# Patient Record
Sex: FEMALE | Race: WHITE
[De-identification: names, ages, dates, MRNs, and addresses within clinical notes are randomized per-mention and may not be internally consistent; named-entity substitution may affect disease eponyms.]

---

## 2020-01-12 ENCOUNTER — HOSPITAL ENCOUNTER (INPATIENT)
Dept: HOSPITAL 4 - SED | Age: 80
LOS: 2 days | Discharge: HOME | DRG: 311 | End: 2020-01-14
Attending: FAMILY MEDICINE | Admitting: FAMILY MEDICINE
Payer: COMMERCIAL

## 2020-01-12 VITALS — HEIGHT: 68 IN | BODY MASS INDEX: 30.01 KG/M2 | WEIGHT: 198 LBS

## 2020-01-12 VITALS — SYSTOLIC BLOOD PRESSURE: 152 MMHG

## 2020-01-12 DIAGNOSIS — Z95.1: ICD-10-CM

## 2020-01-12 DIAGNOSIS — R73.9: ICD-10-CM

## 2020-01-12 DIAGNOSIS — Z82.49: ICD-10-CM

## 2020-01-12 DIAGNOSIS — I25.10: ICD-10-CM

## 2020-01-12 DIAGNOSIS — I24.9: Primary | ICD-10-CM

## 2020-01-12 DIAGNOSIS — I10: ICD-10-CM

## 2020-01-12 DIAGNOSIS — E87.6: ICD-10-CM

## 2020-01-12 LAB
ALBUMIN SERPL BCP-MCNC: 3.6 G/DL (ref 3.4–4.8)
ALT SERPL W P-5'-P-CCNC: 25 U/L (ref 12–78)
ANION GAP SERPL CALCULATED.3IONS-SCNC: 8 MMOL/L (ref 5–15)
AST SERPL W P-5'-P-CCNC: 20 U/L (ref 10–37)
BASOPHILS # BLD AUTO: 0.1 K/UL (ref 0–0.2)
BASOPHILS NFR BLD AUTO: 1.3 % (ref 0–2)
BILIRUB SERPL-MCNC: 0.2 MG/DL (ref 0–1)
BUN SERPL-MCNC: 20 MG/DL (ref 8–21)
CALCIUM SERPL-MCNC: 8.6 MG/DL (ref 8.4–11)
CHLORIDE SERPL-SCNC: 101 MMOL/L (ref 98–107)
CREAT SERPL-MCNC: 1.02 MG/DL (ref 0.55–1.3)
EOSINOPHIL # BLD AUTO: 0.2 K/UL (ref 0–0.4)
EOSINOPHIL NFR BLD AUTO: 2.8 % (ref 0–4)
ERYTHROCYTE [DISTWIDTH] IN BLOOD BY AUTOMATED COUNT: 13.1 % (ref 9–15)
GFR SERPL CREATININE-BSD FRML MDRD: (no result) ML/MIN (ref 90–?)
GLUCOSE SERPL-MCNC: 110 MG/DL (ref 70–99)
HCT VFR BLD AUTO: 38.3 % (ref 36–48)
HGB BLD-MCNC: 12.8 G/DL (ref 12–16)
LYMPHOCYTES # BLD AUTO: 2 K/UL (ref 1–5.5)
LYMPHOCYTES NFR BLD AUTO: 33.5 % (ref 20.5–51.5)
MCH RBC QN AUTO: 29 PG (ref 27–31)
MCHC RBC AUTO-ENTMCNC: 34 % (ref 32–36)
MCV RBC AUTO: 87 FL (ref 79–98)
MONOCYTES # BLD MANUAL: 0.7 K/UL (ref 0–1)
MONOCYTES # BLD MANUAL: 12.5 % (ref 1.7–9.3)
NEUTROPHILS # BLD AUTO: 2.9 K/UL (ref 1.8–7.7)
NEUTROPHILS NFR BLD AUTO: 49.9 % (ref 40–70)
PLATELET # BLD AUTO: 190 K/UL (ref 130–430)
POTASSIUM SERPL-SCNC: 3.4 MMOL/L (ref 3.5–5.1)
RBC # BLD AUTO: 4.39 MIL/UL (ref 4.2–6.2)
SODIUM SERPLBLD-SCNC: 138 MMOL/L (ref 136–145)
WBC # BLD AUTO: 5.9 K/UL (ref 4.8–10.8)

## 2020-01-12 PROCEDURE — G0378 HOSPITAL OBSERVATION PER HR: HCPCS

## 2020-01-12 NOTE — NUR
Placed in room 4  . Placed on cardiac monitor, blood pressure machine and pulse 
oximeter. To gown for exam. Side rails up.

## 2020-01-12 NOTE — NUR
Medication reconciliation completed with information provided by [facility]. Any 
prior medication reconciliation on file was reviewed and corrected.

## 2020-01-12 NOTE — NUR
Patient BIB [facility] from [facility] c/o chest pain that lasted about 15 minutes. Pt 
states she was at home and suddenly felt dizzy and had chest pain, 
non-radiating, non provoked. 10minutes prior to arriving, chest pain suddenly 
disappeared. No medications were given en route. Pt denies N/V, fever. Pt came 
in with 20 gauge to left AC placed by [facility]. NO other injuries/complaints 
per patient or noted.

## 2020-01-13 VITALS — SYSTOLIC BLOOD PRESSURE: 163 MMHG

## 2020-01-13 VITALS — SYSTOLIC BLOOD PRESSURE: 137 MMHG

## 2020-01-13 VITALS — SYSTOLIC BLOOD PRESSURE: 148 MMHG

## 2020-01-13 RX ADMIN — Medication SCH ML: at 20:35

## 2020-01-13 RX ADMIN — OXCARBAZEPINE SCH MG: 150 TABLET, FILM COATED ORAL at 20:35

## 2020-01-13 NOTE — NUR
RN rounds

patient resting in bed, eyes closed, breathing is even and unlabored, no signs of distress, 
continuing to monitor, call light is within reach of patient, bed in lowest position, two 
side rails up, fall precautions in place.

## 2020-01-13 NOTE — NUR
Dr. Peters, Y rounds

assessed patient at bedside, aware of elevated blood pressure, will follow up with any new 
orders.

## 2020-01-13 NOTE — NUR
Assisted patient to restroom and back to [facility]. No acute distress, will 
continue to monitor.

## 2020-01-13 NOTE — NUR
Closing note

patient resting in bed, awake, denies pain, all needs met, will endorse report to NOC shift 
nurse, bed in lowest position, two side rails up, call light within patient reach, fall and 
aspiration precautions in place.

## 2020-01-13 NOTE — NUR
RN rounds

patient resting in bed, awake, denies pain, vital signs taken, provided patient with ice 
water, no other needs at this time, bed in lowest position, two side rails up, call light 
within reach, fall and aspiration precautions in place.

## 2020-01-13 NOTE — NUR
ADMISSION NOTE

Received patient from ER via [facility], received report from MEY LAMBERT. Patient admitted with 
diagnosis of CHEST PAIN. Patient oriented to hospital routine, call light, toileting and 
safety-patient verbalized understanding.

## 2020-01-13 NOTE — NUR
Patient will be admitted to care of Dr. Peters.  Admitted to Telemetry unit.  
Will go to room 108A.  Belongings list completed.  Complete and up to date 
summary report printed. SBAR report given to FAUSTO Willoughby at bedside with 
opportunity for questions.





Transfer to Tele via ACLS protocol. Registered nurse and Monitor tech present. 
IV present no signs or symptoms of infiltration.

## 2020-01-13 NOTE — NUR
Patient will be admitted to care of Dr. SIMONA Peters.  Admitted to Telemetry 
unit. Belongings list completed.  Complete and up to date summary report 
printed. SBAR report to be given at bedside with opportunity for questions.

## 2020-01-13 NOTE — NUR
RN rounds

patient resting in bed, awake, patient denies pain, denies shortness of breath, reinforced 
the tape on her IV, patient has no other needs at this time, bed in lowest position, two 
side rails up, call light within reach, fall and aspiration precautions in place, continuing 
to monitor.

## 2020-01-14 VITALS — SYSTOLIC BLOOD PRESSURE: 144 MMHG

## 2020-01-14 VITALS — SYSTOLIC BLOOD PRESSURE: 135 MMHG

## 2020-01-14 VITALS — SYSTOLIC BLOOD PRESSURE: 149 MMHG

## 2020-01-14 VITALS — SYSTOLIC BLOOD PRESSURE: 148 MMHG

## 2020-01-14 LAB
ANION GAP SERPL CALCULATED.3IONS-SCNC: 5 MMOL/L (ref 5–15)
BASOPHILS # BLD AUTO: 0.1 K/UL (ref 0–0.2)
BASOPHILS NFR BLD AUTO: 1 % (ref 0–2)
BUN SERPL-MCNC: 13 MG/DL (ref 8–21)
CALCIUM SERPL-MCNC: 8.8 MG/DL (ref 8.4–11)
CHLORIDE SERPL-SCNC: 103 MMOL/L (ref 98–107)
CREAT SERPL-MCNC: 0.7 MG/DL (ref 0.55–1.3)
EOSINOPHIL # BLD AUTO: 0.3 K/UL (ref 0–0.4)
EOSINOPHIL NFR BLD AUTO: 5 % (ref 0–4)
ERYTHROCYTE [DISTWIDTH] IN BLOOD BY AUTOMATED COUNT: 13.3 % (ref 9–15)
GFR SERPL CREATININE-BSD FRML MDRD: (no result) ML/MIN (ref 90–?)
GLUCOSE SERPL-MCNC: 94 MG/DL (ref 70–99)
HCT VFR BLD AUTO: 40.7 % (ref 36–48)
HGB BLD-MCNC: 13.7 G/DL (ref 12–16)
LYMPHOCYTES # BLD AUTO: 2 K/UL (ref 1–5.5)
LYMPHOCYTES NFR BLD AUTO: 37.8 % (ref 20.5–51.5)
MCH RBC QN AUTO: 29 PG (ref 27–31)
MCHC RBC AUTO-ENTMCNC: 34 % (ref 32–36)
MCV RBC AUTO: 87 FL (ref 79–98)
MONOCYTES # BLD MANUAL: 0.7 K/UL (ref 0–1)
MONOCYTES # BLD MANUAL: 13.1 % (ref 1.7–9.3)
NEUTROPHILS # BLD AUTO: 2.2 K/UL (ref 1.8–7.7)
NEUTROPHILS NFR BLD AUTO: 43.1 % (ref 40–70)
PLATELET # BLD AUTO: 192 K/UL (ref 130–430)
POTASSIUM SERPL-SCNC: 3.5 MMOL/L (ref 3.5–5.1)
RBC # BLD AUTO: 4.68 MIL/UL (ref 4.2–6.2)
SODIUM SERPLBLD-SCNC: 138 MMOL/L (ref 136–145)
WBC # BLD AUTO: 5.2 K/UL (ref 4.8–10.8)

## 2020-01-14 RX ADMIN — Medication SCH ML: at 05:51

## 2020-01-14 RX ADMIN — OXCARBAZEPINE SCH MG: 150 TABLET, FILM COATED ORAL at 08:24

## 2020-01-14 NOTE — NUR
Hourly Rounding 

 patient Resting call bell given to patient chest movement symmetrical self position patient 
is ambulatory .

## 2020-01-14 NOTE — NUR
Note

Pt sitting up in bed eating her breakfast. No SOB/resp distress or chest pain/discomfort was 
noted at this time. Tele unit attached and intact. IV in left AC intact and patent. Pt 
ambulates to restroom with steady gait and independently at this time. No needs noted. Call 
light within reach.

## 2020-01-14 NOTE — NUR
DC PLANNING

RECEIVED CALL FROM SERAFIN MARTIN AT PHONE NUMBER [phone] ADVISED CASE WAS BEING 
APPROVED AS OBS LEVEL OF CARE ONLY. REQUESTING WHAT IS HOLDING DC ADVISED CARDIOLOGY 
CONSULT. CM WILL NOTIFY PRIMARY ATTENDING OF INSURANCE DETERMINATION.

## 2020-01-14 NOTE — NUR
FALL MEASURES implemented 

 call bell given to patient procedures explained SAFETY MEASURES effective .

## 2020-01-14 NOTE — NUR
Note

Tele unit dc'd and returned to monitor tech. IV in left AC dc'd. Site benign, no 
swelling/redness/bleeding/drainage noted at this time.

## 2020-01-14 NOTE — NUR
SS NOTES:



MSW was referred by CM to see patient for DCP. Demographic information confirmed (NOK: Satya Olivera, son @ [phone], Person to Notify: Rell Olivera, son @ [phone]).



MSW met with patient at bedside. Pt was alert and oriented, and cooperative. Pt appeared to 
be disheveled, normal mood and speech with good eye contact. Pt's affect was appropriate and 
thought process was intact.



Pt is an 78 y/o   female who came in for chest pain. Pt is a resident at 
[facility] for 2 years now. Pt is independent with her ADL's, including driving to/from MD 
appointments. Pt utilizes a cane to go around the community only, otherwise she is 
independent with ambulating short distances. Pt's residence provides her with meals 3x/day, 
laundry and housekeeping. Pt's source of income is her longterm, social security and her 
savings. Pt states she finds support from her sister that lives in [place], but has three 
other sons for additional support. Pt has an advanced directive and son Satya Olivera is her 
POA. Pt states she has a long history of depression and has "a little bit of depression" 
currently due to "arguing with staff to get my bladder pills last night". Pt states she 
asked the RN to get her bladder pills last night but it took a long time to obtain it. Pt 
denies psych admissions in the past. Pt states her PCP prescribes her with Paxil for her 
depression. Pt states she demetrio with "activities", like "Poker" when she's depressed. Pt 
denies history or current substance use/abuse/smoking. No further SS needs identified at 
this time, but will remain available for support.

## 2020-01-14 NOTE — NUR
Note

Pt dressed in orange gown and bath robe. Pt's clothes all soiled on admission from ED. Pt 
packed all belongings and discharge instructions given. Questions/concerns were answered at 
this time. Pt stable, denies any SOB/resp distress or chest pain/discomfort

Pt checked side table and drawers for belongings. Call light within reach.

## 2020-01-14 NOTE — NUR
Patient assist out of bed 

 ambulates BRP no SOB chest movement symmetrical safety measures implemented & effective .

## 2020-01-14 NOTE — NUR
Note

Pt off the floor via wheelchair with all her belongings and discharge paperwork, daughter in 
law by her side to private car. Pt stable, no chest pain/discomfort noted all shift. 

-------------------------------------------------------------------------------

Addendum: 01/14/20 at 1501 by Maggi Lane RN

-------------------------------------------------------------------------------

Pt was checked on q1' and PRN all shift for needs and care.

## 2020-01-14 NOTE — NUR
Note

Pt ambulates in hallway with steady gait and no needs noted. Pt informed Dr FARRAH Peters has 
discharged pt from cardiac point of view. Pt's son Norm called and he will pick pt up at 
1300 -  he is at work. Pt notified. Call light within reach. No needs noted.

## 2020-03-17 ENCOUNTER — HOSPITAL ENCOUNTER (EMERGENCY)
Dept: HOSPITAL 4 - SED | Age: 80
Discharge: LEFT BEFORE DISCHARGE | End: 2020-03-17
Payer: COMMERCIAL

## 2020-03-17 ENCOUNTER — HOSPITAL ENCOUNTER (INPATIENT)
Dept: HOSPITAL 4 - SED | Age: 80
LOS: 4 days | Discharge: HOME | DRG: 640 | End: 2020-03-21
Attending: INTERNAL MEDICINE | Admitting: INTERNAL MEDICINE
Payer: COMMERCIAL

## 2020-03-17 VITALS — SYSTOLIC BLOOD PRESSURE: 150 MMHG

## 2020-03-17 VITALS — HEIGHT: 67 IN | WEIGHT: 200 LBS | BODY MASS INDEX: 31.39 KG/M2

## 2020-03-17 VITALS — SYSTOLIC BLOOD PRESSURE: 151 MMHG

## 2020-03-17 DIAGNOSIS — Z79.899: ICD-10-CM

## 2020-03-17 DIAGNOSIS — J20.9: ICD-10-CM

## 2020-03-17 DIAGNOSIS — I95.1: ICD-10-CM

## 2020-03-17 DIAGNOSIS — R53.1: Primary | ICD-10-CM

## 2020-03-17 DIAGNOSIS — E87.6: ICD-10-CM

## 2020-03-17 DIAGNOSIS — M19.90: ICD-10-CM

## 2020-03-17 DIAGNOSIS — Z95.1: ICD-10-CM

## 2020-03-17 DIAGNOSIS — N39.0: ICD-10-CM

## 2020-03-17 DIAGNOSIS — I10: ICD-10-CM

## 2020-03-17 DIAGNOSIS — Z90.710: ICD-10-CM

## 2020-03-17 DIAGNOSIS — I25.10: ICD-10-CM

## 2020-03-17 DIAGNOSIS — E66.9: ICD-10-CM

## 2020-03-17 DIAGNOSIS — E11.9: ICD-10-CM

## 2020-03-17 DIAGNOSIS — Z53.21: ICD-10-CM

## 2020-03-17 DIAGNOSIS — R13.10: ICD-10-CM

## 2020-03-17 DIAGNOSIS — E87.1: Primary | ICD-10-CM

## 2020-03-17 DIAGNOSIS — N17.0: ICD-10-CM

## 2020-03-17 DIAGNOSIS — D64.9: ICD-10-CM

## 2020-03-17 DIAGNOSIS — E86.0: ICD-10-CM

## 2020-03-17 DIAGNOSIS — Z86.73: ICD-10-CM

## 2020-03-17 DIAGNOSIS — R56.9: ICD-10-CM

## 2020-03-17 DIAGNOSIS — I65.29: ICD-10-CM

## 2020-03-17 DIAGNOSIS — E44.0: ICD-10-CM

## 2020-03-17 DIAGNOSIS — F32.9: ICD-10-CM

## 2020-03-17 LAB
ALBUMIN SERPL BCP-MCNC: 2.7 G/DL (ref 3.4–4.8)
ALT SERPL W P-5'-P-CCNC: 37 U/L (ref 12–78)
ANION GAP SERPL CALCULATED.3IONS-SCNC: 9 MMOL/L (ref 5–15)
AST SERPL W P-5'-P-CCNC: 48 U/L (ref 10–37)
BASOPHILS # BLD AUTO: 0 K/UL (ref 0–0.2)
BASOPHILS NFR BLD AUTO: 0.4 % (ref 0–2)
BILIRUB SERPL-MCNC: 0.9 MG/DL (ref 0–1)
BUN SERPL-MCNC: 38 MG/DL (ref 8–21)
CALCIUM SERPL-MCNC: 9.1 MG/DL (ref 8.4–11)
CHLORIDE SERPL-SCNC: 89 MMOL/L (ref 98–107)
CREAT SERPL-MCNC: 1.42 MG/DL (ref 0.55–1.3)
EOSINOPHIL # BLD AUTO: 0.2 K/UL (ref 0–0.4)
EOSINOPHIL NFR BLD AUTO: 1.7 % (ref 0–4)
ERYTHROCYTE [DISTWIDTH] IN BLOOD BY AUTOMATED COUNT: 13.6 % (ref 9–15)
GFR SERPL CREATININE-BSD FRML MDRD: (no result) ML/MIN (ref 90–?)
GLUCOSE SERPL-MCNC: 100 MG/DL (ref 70–99)
HCT VFR BLD AUTO: 35 % (ref 36–48)
HGB BLD-MCNC: 11.9 G/DL (ref 12–16)
LYMPHOCYTES # BLD AUTO: 0.8 K/UL (ref 1–5.5)
LYMPHOCYTES NFR BLD AUTO: 8.7 % (ref 20.5–51.5)
MCH RBC QN AUTO: 29 PG (ref 27–31)
MCHC RBC AUTO-ENTMCNC: 34 % (ref 32–36)
MCV RBC AUTO: 87 FL (ref 79–98)
MONOCYTES # BLD MANUAL: 1.2 K/UL (ref 0–1)
MONOCYTES # BLD MANUAL: 12.8 % (ref 1.7–9.3)
NEUTROPHILS # BLD AUTO: 7.4 K/UL (ref 1.8–7.7)
NEUTROPHILS NFR BLD AUTO: 76.4 % (ref 40–70)
PLATELET # BLD AUTO: 173 K/UL (ref 130–430)
POTASSIUM SERPL-SCNC: 3.5 MMOL/L (ref 3.5–5.1)
RBC # BLD AUTO: 4.05 MIL/UL (ref 4.2–6.2)
SODIUM SERPLBLD-SCNC: 125 MMOL/L (ref 136–145)
WBC # BLD AUTO: 9.6 K/UL (ref 4.8–10.8)

## 2020-03-17 PROCEDURE — G0378 HOSPITAL OBSERVATION PER HR: HCPCS

## 2020-03-17 NOTE — NUR
pt was initially bib via bls d/t gen weak and a cough for a year. Pt called 911 
herself, however, did not want to stay in this hospital. Pt's family was 
notified and convinced her to stay. Family expressed concerns after the pt had 
a syncopal episode. PT is AAOx3. EKG done and given to MD. Lung sounds a clear. 
Cardiac monitor placed. Will continue to monitor.

## 2020-03-17 NOTE — NUR
Initial RN notes



Received pt from ED.  Pt AAOx3, VSS, afebrile.  No s/s distress noted.  IVF infusing R. AC 
20G clear and patent.  Small bruise noted on R. abd, skin intact.  Oriented pt to bed/call 
light use, pt verbalized understanding.  Bed low, locked, siderails up x3, alarm on.  Safety 
maintained.  To monitor.

## 2020-03-17 NOTE — NUR
Transfer to TELE via ACLS protocol. Licensed nurse present. IV present no signs 
or symptoms of infiltration.

## 2020-03-17 NOTE — NUR
-------------------------------------------------------------------------------

           *** Note undone in [facility], [place] - 03/17/20 at 1923 by [email or web address] ***            

-------------------------------------------------------------------------------

Report received from Nat LAMBERT

## 2020-03-17 NOTE — NUR
Patient will be admitted to care of .  Admitted to TELE unit.  Will go 
to room 133B.  Belongings list completed.  Complete and up to date summary 
report printed. SBAR report to be given at bedside with opportunity for 
questions.

## 2020-03-17 NOTE — NUR
# 20 gauge angiocath placed to rac.  Use of asceptic technique.  Opsite placed 
over site.  Blood return noted.  Blood for lab drawn from site.  Flushed with 
10 cc of normal saline.  No evidence of infiltration noted.  Patient tolerated 
well.

## 2020-03-17 NOTE — NUR
ADMISSION NOTE

Received patient from ER via [facility], received report from FAUSTO FREY. Patient admitted with 
diagnosis of SYNCOPE, HYPONATREMIA. Patient oriented to hospital routine, call light, 
toileting and safety-patient verbalized understanding.

## 2020-03-17 NOTE — NUR
report given to Aleksandr LAMBERT. Pt is in stable condition. Currently awaiting 
transfer to [facility] or admission.

## 2020-03-18 VITALS — SYSTOLIC BLOOD PRESSURE: 181 MMHG

## 2020-03-18 VITALS — SYSTOLIC BLOOD PRESSURE: 165 MMHG

## 2020-03-18 VITALS — SYSTOLIC BLOOD PRESSURE: 140 MMHG

## 2020-03-18 VITALS — SYSTOLIC BLOOD PRESSURE: 154 MMHG

## 2020-03-18 VITALS — SYSTOLIC BLOOD PRESSURE: 168 MMHG

## 2020-03-18 VITALS — SYSTOLIC BLOOD PRESSURE: 151 MMHG

## 2020-03-18 LAB
ALBUMIN SERPL BCP-MCNC: 2.4 G/DL (ref 3.4–4.8)
ALT SERPL W P-5'-P-CCNC: 82 U/L (ref 12–78)
ANION GAP SERPL CALCULATED.3IONS-SCNC: 11 MMOL/L (ref 5–15)
APPEARANCE UR: (no result)
AST SERPL W P-5'-P-CCNC: 82 U/L (ref 10–37)
BACTERIA URNS QL MICRO: (no result) /HPF
BASOPHILS # BLD AUTO: 0 K/UL (ref 0–0.2)
BASOPHILS NFR BLD AUTO: 0.4 % (ref 0–2)
BILIRUB SERPL-MCNC: 0.8 MG/DL (ref 0–1)
BILIRUB UR QL STRIP: NEGATIVE
BUN SERPL-MCNC: 31 MG/DL (ref 8–21)
CALCIUM SERPL-MCNC: 8 MG/DL (ref 8.4–11)
CHLORIDE SERPL-SCNC: 95 MMOL/L (ref 98–107)
CHOLEST SERPL-MCNC: 158 MG/DL (ref ?–200)
COLOR UR: YELLOW
CREAT SERPL-MCNC: 1.21 MG/DL (ref 0.55–1.3)
EOSINOPHIL # BLD AUTO: 0.2 K/UL (ref 0–0.4)
EOSINOPHIL NFR BLD AUTO: 1.7 % (ref 0–4)
ERYTHROCYTE [DISTWIDTH] IN BLOOD BY AUTOMATED COUNT: 13.3 % (ref 9–15)
GFR SERPL CREATININE-BSD FRML MDRD: (no result) ML/MIN (ref 90–?)
GLUCOSE SERPL-MCNC: 123 MG/DL (ref 70–99)
GLUCOSE UR STRIP-MCNC: NEGATIVE MG/DL
HCT VFR BLD AUTO: 32.5 % (ref 36–48)
HDLC SERPL-MCNC: 19 MG/DL (ref 55–?)
HGB BLD-MCNC: 11.2 G/DL (ref 12–16)
HGB UR QL STRIP: (no result)
KETONES UR STRIP-MCNC: NEGATIVE MG/DL
LDL CHOLESTEROL: 78 MG/DL (ref ?–100)
LEUKOCYTE ESTERASE UR QL STRIP: (no result)
LYMPHOCYTES # BLD AUTO: 0.7 K/UL (ref 1–5.5)
LYMPHOCYTES NFR BLD AUTO: 6.5 % (ref 20.5–51.5)
MCH RBC QN AUTO: 30 PG (ref 27–31)
MCHC RBC AUTO-ENTMCNC: 34 % (ref 32–36)
MCV RBC AUTO: 86 FL (ref 79–98)
MONOCYTES # BLD MANUAL: 1.3 K/UL (ref 0–1)
MONOCYTES # BLD MANUAL: 12.3 % (ref 1.7–9.3)
NEUTROPHILS # BLD AUTO: 8.1 K/UL (ref 1.8–7.7)
NEUTROPHILS NFR BLD AUTO: 79.1 % (ref 40–70)
NITRITE UR QL STRIP: POSITIVE
PH UR STRIP: 6 [PH] (ref 5–8)
PLATELET # BLD AUTO: 166 K/UL (ref 130–430)
POTASSIUM SERPL-SCNC: 2.4 MMOL/L (ref 3.5–5.1)
PROT UR QL STRIP: (no result)
RBC # BLD AUTO: 3.78 MIL/UL (ref 4.2–6.2)
SODIUM SERPLBLD-SCNC: 131 MMOL/L (ref 136–145)
SP GR UR STRIP: 1.01 (ref 1–1.03)
T4 FREE SERPL-MCNC: 1 NG/DL (ref 0.8–1.5)
TRIGL SERPL-MCNC: 141 MG/DL (ref 30–150)
TSH SERPL DL<=0.05 MIU/L-ACNC: 0.8 UIU/ML (ref 0.36–3.74)
UROBILINOGEN UR STRIP-MCNC: 0.2 MG/DL (ref 0.2–1)
WBC # BLD AUTO: 10.2 K/UL (ref 4.8–10.8)
WBC #/AREA URNS HPF: (no result) /HPF (ref 0–3)

## 2020-03-18 RX ADMIN — POTASSIUM CHLORIDE SCH MEQ: 20 TABLET, EXTENDED RELEASE ORAL at 21:30

## 2020-03-18 RX ADMIN — CLOPIDOGREL BISULFATE SCH MG: 75 TABLET, FILM COATED ORAL at 08:50

## 2020-03-18 RX ADMIN — OXCARBAZEPINE SCH MG: 150 TABLET, FILM COATED ORAL at 08:50

## 2020-03-18 RX ADMIN — HYDROMORPHONE HYDROCHLORIDE PRN MG: 8 TABLET ORAL at 05:22

## 2020-03-18 RX ADMIN — OXCARBAZEPINE SCH MG: 150 TABLET, FILM COATED ORAL at 21:31

## 2020-03-18 RX ADMIN — EZETIMIBE SCH MG: 10 TABLET ORAL at 08:50

## 2020-03-18 RX ADMIN — OXYBUTYNIN CHLORIDE SCH MG: 5 TABLET ORAL at 21:30

## 2020-03-18 RX ADMIN — METOPROLOL SUCCINATE SCH MG: 50 TABLET, EXTENDED RELEASE ORAL at 21:31

## 2020-03-18 RX ADMIN — OXYBUTYNIN CHLORIDE SCH MG: 5 TABLET ORAL at 08:56

## 2020-03-18 NOTE — NUR
Note

Pt ate her dinner and now resting in bed. Tele unit attached and intact at this time. IV in 
right AC intact and patent infusing IVF's well. No SOB/resp distress or pain/discomfort 
noted at this time. Pt was checked on q1' and PRN all shift for needs and care. No needs 
noted at this time. Call light within reach.

## 2020-03-18 NOTE — NUR
change of shift.pt.presents quiescent affect;calm,resting pt.presents iv access 
intact;patent iv fluids;ns:3% infusing.

general status stable.respiratory status stable;unlabored@room air.call light/telephone w/in 
reach of the pt.

## 2020-03-18 NOTE — NUR
Rounds/snacks



Pt awake, alert, requesting food.  [place] and cranberry juice provided.  Pt thankful.  HOB 
elevated.  Call light within reach.  To monitor.

## 2020-03-18 NOTE — NUR
Rounds



Pt asleep, easily arousable.  Pt moved to Room 121-C.  All belongings with pt.   To monitor.

## 2020-03-18 NOTE — NUR
Note

Pt was taken down to Radiology for CT of head/brain via gurney at 1015am and now back to 
room/bed. IVF's reapplied. No needs noted at this time. Call light within reach.

## 2020-03-18 NOTE — NUR
pt.assessed.pt.presents quiescent affect;calm,somnolent.pt.assessed for 
cleanliness.pt.repositioned.iv fluid:ns:3% administration completed.

general status stable.respiratory status stable;unlabored.call light/telephone placed w/in 
reach of the pt.

## 2020-03-18 NOTE — NUR
Note

Pt working with Physical Therapy at bedside at this time. 

Dr Arellano was at bedside assessing pt at 1115am - answering questions/concerns at this time. 
Call light within reach. No needs noted at this time.

## 2020-03-18 NOTE — NUR
pt.assessed.v/s assessed values w/in normal limits.no c/o pain,nausea.pt.assessed for 
cleanliness.pt.presents incontinence ;urine.pt.cleaned.pt.repositioned.iv access 
intact;patent;ns:3% infusing.general status stable.respiratory status stable;

unlabored

@room air.i have apprised the pt.that snacks/beverages are available w/in the shift.no 
requests posited@this hour.call light/

telephone phone placed w/in reach of the pt.

## 2020-03-18 NOTE — NUR
Closing notes/Pain med



Pt awake, c/o L. sided sharp headache.  Medicated with Tylenol 500mg PO.  VSS.  IVF infusing 
at ordered rate R. AC 20G clear and patent.  Call light within easy reach.  [employer] SCDs in 
place.  Safety maintained.  Bed low, locked, siderails up x3.  Awaiting consults.  To 
endorse to AM nurse.

## 2020-03-18 NOTE — NUR
Note

Pt sitting up in bed eating her breakfast. No SOB/resp distress or pain/discomfort noted at 
this time. Tele unit attached and intact. IV in right AC intact and patent infusing IVF's 
well at this time. No needs noted at this time. Call light within reach. Pt has SCD's on 
bilaterally at this time.

## 2020-03-18 NOTE — NUR
Note

Pt having US of carotids at bedside at this time. Pt denies any needs. Call light within 
reach.

## 2020-03-18 NOTE — NUR
CONSULT:

CONSULT CALLED FOR DR. GRIER

I SPOKE WITH JASPER EXCHANGE

REASON FOR CONSULT: SYNCOPE

REQUESTING CONSULT: DR. GUIDO

CONSULTANT PHONE NUMBER: [phone]

## 2020-03-18 NOTE — NUR
CONSULT:

CONSULT CALLED FOR SINCERE CAT SPOKE WITH JASPER EXCHANGE

REASON FOR CONSULT: SYNCOPE

REQUESTING CONSULT: DR. GUIDO

CONSULTANT PHONE NUMBER: [phone]

## 2020-03-18 NOTE — NUR
Nutrition Update



Keagan Scale 17 noted.

Pt admitted for syncope/hyponatremia.

Diet: regular, mechanical soft

BMI: 31.3 kg/m2



RD to follow per nutrition care standards.

## 2020-03-18 NOTE — NUR
2100pmedications administered.pt.capable to ingest medication absent difficulty.i noted 
pt.ordered the medication trileptal;300mg po.

i have attended to the sx precautions;padded the side rails.i have apprised the pt.of the 
intervention.i have applied the scd's 

compression stockings.i have apprised the of the indication for the intervention.no c/o 
pain,nausea.

## 2020-03-19 VITALS — SYSTOLIC BLOOD PRESSURE: 140 MMHG

## 2020-03-19 VITALS — SYSTOLIC BLOOD PRESSURE: 129 MMHG

## 2020-03-19 VITALS — SYSTOLIC BLOOD PRESSURE: 156 MMHG

## 2020-03-19 VITALS — SYSTOLIC BLOOD PRESSURE: 152 MMHG

## 2020-03-19 VITALS — SYSTOLIC BLOOD PRESSURE: 128 MMHG

## 2020-03-19 VITALS — SYSTOLIC BLOOD PRESSURE: 162 MMHG

## 2020-03-19 LAB
ALBUMIN SERPL BCP-MCNC: 2.5 G/DL (ref 3.4–4.8)
ALT SERPL W P-5'-P-CCNC: 110 U/L (ref 12–78)
ANION GAP SERPL CALCULATED.3IONS-SCNC: 8 MMOL/L (ref 5–15)
AST SERPL W P-5'-P-CCNC: 80 U/L (ref 10–37)
BASOPHILS # BLD AUTO: 0 K/UL (ref 0–0.2)
BASOPHILS NFR BLD AUTO: 0.2 % (ref 0–2)
BILIRUB SERPL-MCNC: 0.8 MG/DL (ref 0–1)
BUN SERPL-MCNC: 24 MG/DL (ref 8–21)
CALCIUM SERPL-MCNC: 8.6 MG/DL (ref 8.4–11)
CHLORIDE SERPL-SCNC: 100 MMOL/L (ref 98–107)
CREAT SERPL-MCNC: 1.23 MG/DL (ref 0.55–1.3)
EOSINOPHIL # BLD AUTO: 0.1 K/UL (ref 0–0.4)
EOSINOPHIL NFR BLD AUTO: 0.7 % (ref 0–4)
ERYTHROCYTE [DISTWIDTH] IN BLOOD BY AUTOMATED COUNT: 13.8 % (ref 9–15)
GFR SERPL CREATININE-BSD FRML MDRD: (no result) ML/MIN (ref 90–?)
GLUCOSE SERPL-MCNC: 128 MG/DL (ref 70–99)
HCT VFR BLD AUTO: 34.3 % (ref 36–48)
HGB BLD-MCNC: 11.6 G/DL (ref 12–16)
LYMPHOCYTES # BLD AUTO: 0.7 K/UL (ref 1–5.5)
LYMPHOCYTES NFR BLD AUTO: 6.1 % (ref 20.5–51.5)
MCH RBC QN AUTO: 30 PG (ref 27–31)
MCHC RBC AUTO-ENTMCNC: 34 % (ref 32–36)
MCV RBC AUTO: 87 FL (ref 79–98)
MONOCYTES # BLD MANUAL: 1.4 K/UL (ref 0–1)
MONOCYTES # BLD MANUAL: 13 % (ref 1.7–9.3)
NEUTROPHILS # BLD AUTO: 8.6 K/UL (ref 1.8–7.7)
NEUTROPHILS NFR BLD AUTO: 80 % (ref 40–70)
PLATELET # BLD AUTO: 189 K/UL (ref 130–430)
POTASSIUM SERPL-SCNC: 3.4 MMOL/L (ref 3.5–5.1)
RBC # BLD AUTO: 3.92 MIL/UL (ref 4.2–6.2)
SODIUM SERPLBLD-SCNC: 135 MMOL/L (ref 136–145)
WBC # BLD AUTO: 10.7 K/UL (ref 4.8–10.8)

## 2020-03-19 RX ADMIN — METOPROLOL SUCCINATE SCH MG: 50 TABLET, EXTENDED RELEASE ORAL at 20:51

## 2020-03-19 RX ADMIN — DEXTROSE MONOHYDRATE SCH MLS/HR: 50 INJECTION, SOLUTION INTRAVENOUS at 13:25

## 2020-03-19 RX ADMIN — IPRATROPIUM BROMIDE AND ALBUTEROL SULFATE SCH ML: .5; 3 SOLUTION RESPIRATORY (INHALATION) at 07:06

## 2020-03-19 RX ADMIN — EZETIMIBE SCH MG: 10 TABLET ORAL at 08:50

## 2020-03-19 RX ADMIN — OXYBUTYNIN CHLORIDE SCH MG: 5 TABLET ORAL at 20:50

## 2020-03-19 RX ADMIN — CLOPIDOGREL BISULFATE SCH MG: 75 TABLET, FILM COATED ORAL at 08:50

## 2020-03-19 RX ADMIN — IPRATROPIUM BROMIDE AND ALBUTEROL SULFATE SCH ML: .5; 3 SOLUTION RESPIRATORY (INHALATION) at 11:13

## 2020-03-19 RX ADMIN — OXCARBAZEPINE SCH MG: 150 TABLET, FILM COATED ORAL at 08:50

## 2020-03-19 RX ADMIN — IPRATROPIUM BROMIDE AND ALBUTEROL SULFATE SCH ML: .5; 3 SOLUTION RESPIRATORY (INHALATION) at 23:00

## 2020-03-19 RX ADMIN — IPRATROPIUM BROMIDE AND ALBUTEROL SULFATE SCH ML: .5; 3 SOLUTION RESPIRATORY (INHALATION) at 19:28

## 2020-03-19 RX ADMIN — IPRATROPIUM BROMIDE AND ALBUTEROL SULFATE SCH ML: .5; 3 SOLUTION RESPIRATORY (INHALATION) at 15:14

## 2020-03-19 RX ADMIN — POTASSIUM CHLORIDE SCH MEQ: 20 TABLET, EXTENDED RELEASE ORAL at 08:50

## 2020-03-19 RX ADMIN — Medication SCH CAP: at 20:50

## 2020-03-19 RX ADMIN — IPRATROPIUM BROMIDE AND ALBUTEROL SULFATE SCH ML: .5; 3 SOLUTION RESPIRATORY (INHALATION) at 03:15

## 2020-03-19 RX ADMIN — POTASSIUM CHLORIDE SCH MEQ: 20 TABLET, EXTENDED RELEASE ORAL at 20:51

## 2020-03-19 RX ADMIN — OXCARBAZEPINE SCH MG: 150 TABLET, FILM COATED ORAL at 20:50

## 2020-03-19 RX ADMIN — OXYBUTYNIN CHLORIDE SCH MG: 5 TABLET ORAL at 08:50

## 2020-03-19 RX ADMIN — ZOLPIDEM TARTRATE PRN MG: 5 TABLET, FILM COATED ORAL at 02:34

## 2020-03-19 NOTE — NUR
Initial note:



Rec'd report from dayshift RN. Patient is awake in bed, no acute distress. Alert and 
oriented x4, tolerating 2L NC. IV site patent and intact. Call light with patient. Safety, 
fall, seizure precautions in place. Will continue with plan of care.

## 2020-03-19 NOTE — NUR
Rounds:



Patient is awake watching TV. No acute distress. Denies pain, shortness of breath. Call 
light with patient. Will continue to monitor.

## 2020-03-19 NOTE — NUR
END RN NOTES 

WILL CONT CARE PATIENT WILL CONT WITH BP MONITORING , PATIENT NO AR/SE TO ATB GIVEN , 
PATIENT IS COOPERATIVE IN HER CARE , DR GUIDO AWARE OF THE PATIENT CONDITION, NO DISTRESS, 
NO COMPLAIN OF PAIN THE WHOLE SHIFT, IN STABLE CONDITION

## 2020-03-19 NOTE — NUR
pt.assisted oob;to bsc/chair.pt.presents change in affect;restless.pt.requested to be 
assisted:oob.lower extremity weakness.

fall precautions noted.explained to the pt.pt.insisted to be allowed oob.pt.assisted oob/ 
returned to bed. paged re;pt's

change in status.

## 2020-03-19 NOTE — NUR
pt.assessed.v/s assessed;values w/in normal limits.pt.assessed for cleanliness.pt.presents 
incontinence;urine.pt.cleaned.

pt repositioned.

general status stable.respiratory status stable;unlabored.call light/telephone placed w/in 
reach of the pt.

## 2020-03-19 NOTE — NUR
pt.assessed.pt.presents quiescent affect;calm,somnolent pt.assessed for 
cleanliness.pt.repositioned.general status stable.respiratory status stable.o2-therapy 
applied;via nasal cannulae@the rate2l/min.hhn;duo-neb was administered.

breathing pattern/character,sounds auscultated.improved.call light/telephone placed w/in the 
reach of the pt.

## 2020-03-19 NOTE — NUR
pt.assessed.pt.presents congested chest sounds;lugs auscultated;pt.stated she may require o2 
therapy.o2-sat%=96%

@room iar.

 paged.i have apprised  of the pt's respiratory status. has ordered 
hhn:duo-neb qid q-4hrs;prn;sob,wheezes.pt.had requested robitussin ;cough. has 
ordered robitussin;10ml po q-6hrs/prn.

## 2020-03-19 NOTE — NUR
RN INITIAL NOTES 

RECEIVE DPATIENT IN BED ALERT ABLE TO ANSWER QUESTIONS , NO IV ACCESS , RESP EVEN AND 
UNLABORED

## 2020-03-19 NOTE — NUR
pt.assessed.pt.requested assistance oob/bsc.i have assisted the pt.oob/bsc. had 
returned the page.i appprised  

of the pt's change in status. ordered;ambien;10mg po qhs/prn;sleep.mom,:60ml po 
daily/prn;constipatiuon,dulcolax;10mg po;daily/

prn constipation,i have administered the medications.pt.assisted returned to bed.pt 
repositioned.

## 2020-03-19 NOTE — NUR
pt.assessed.pt.presents quiescent affect;calm,somnolent.pt.assessed for 
cleanliness.pt.repositioned.general status stable.respiratory 

status stable;unlabored;02-sat%=96%@2l/min via nasal cannnulae.call light/telephone placed 
w/in reach of the pt.

## 2020-03-20 VITALS — SYSTOLIC BLOOD PRESSURE: 146 MMHG

## 2020-03-20 VITALS — SYSTOLIC BLOOD PRESSURE: 123 MMHG

## 2020-03-20 VITALS — SYSTOLIC BLOOD PRESSURE: 134 MMHG

## 2020-03-20 VITALS — SYSTOLIC BLOOD PRESSURE: 126 MMHG

## 2020-03-20 VITALS — SYSTOLIC BLOOD PRESSURE: 177 MMHG

## 2020-03-20 VITALS — SYSTOLIC BLOOD PRESSURE: 152 MMHG

## 2020-03-20 LAB
ANION GAP SERPL CALCULATED.3IONS-SCNC: 3 MMOL/L (ref 5–15)
BUN SERPL-MCNC: 27 MG/DL (ref 8–21)
CALCIUM SERPL-MCNC: 8.7 MG/DL (ref 8.4–11)
CHLORIDE SERPL-SCNC: 101 MMOL/L (ref 98–107)
CREAT SERPL-MCNC: 1.58 MG/DL (ref 0.55–1.3)
GFR SERPL CREATININE-BSD FRML MDRD: (no result) ML/MIN (ref 90–?)
GLUCOSE SERPL-MCNC: 106 MG/DL (ref 70–99)
POTASSIUM SERPL-SCNC: 4.7 MMOL/L (ref 3.5–5.1)
SODIUM SERPLBLD-SCNC: 131 MMOL/L (ref 136–145)

## 2020-03-20 RX ADMIN — IPRATROPIUM BROMIDE AND ALBUTEROL SULFATE SCH ML: .5; 3 SOLUTION RESPIRATORY (INHALATION) at 11:21

## 2020-03-20 RX ADMIN — IPRATROPIUM BROMIDE AND ALBUTEROL SULFATE SCH ML: .5; 3 SOLUTION RESPIRATORY (INHALATION) at 23:48

## 2020-03-20 RX ADMIN — ZOLPIDEM TARTRATE PRN MG: 5 TABLET, FILM COATED ORAL at 22:07

## 2020-03-20 RX ADMIN — METOPROLOL SUCCINATE SCH MG: 50 TABLET, EXTENDED RELEASE ORAL at 22:07

## 2020-03-20 RX ADMIN — OXCARBAZEPINE SCH MG: 150 TABLET, FILM COATED ORAL at 08:04

## 2020-03-20 RX ADMIN — Medication SCH CAP: at 08:03

## 2020-03-20 RX ADMIN — OXYBUTYNIN CHLORIDE SCH MG: 5 TABLET ORAL at 08:08

## 2020-03-20 RX ADMIN — OXYBUTYNIN CHLORIDE SCH MG: 5 TABLET ORAL at 22:06

## 2020-03-20 RX ADMIN — CLOPIDOGREL BISULFATE SCH MG: 75 TABLET, FILM COATED ORAL at 08:08

## 2020-03-20 RX ADMIN — LEVOFLOXACIN SCH MG: 250 TABLET, FILM COATED ORAL at 09:26

## 2020-03-20 RX ADMIN — HYDROMORPHONE HYDROCHLORIDE PRN MG: 8 TABLET ORAL at 03:47

## 2020-03-20 RX ADMIN — Medication SCH CAP: at 22:06

## 2020-03-20 RX ADMIN — IPRATROPIUM BROMIDE AND ALBUTEROL SULFATE SCH ML: .5; 3 SOLUTION RESPIRATORY (INHALATION) at 15:24

## 2020-03-20 RX ADMIN — POTASSIUM CHLORIDE SCH MEQ: 20 TABLET, EXTENDED RELEASE ORAL at 08:08

## 2020-03-20 RX ADMIN — OXCARBAZEPINE SCH MG: 150 TABLET, FILM COATED ORAL at 22:06

## 2020-03-20 RX ADMIN — IPRATROPIUM BROMIDE AND ALBUTEROL SULFATE SCH ML: .5; 3 SOLUTION RESPIRATORY (INHALATION) at 07:23

## 2020-03-20 RX ADMIN — IPRATROPIUM BROMIDE AND ALBUTEROL SULFATE SCH ML: .5; 3 SOLUTION RESPIRATORY (INHALATION) at 03:28

## 2020-03-20 RX ADMIN — EZETIMIBE SCH MG: 10 TABLET ORAL at 08:03

## 2020-03-20 RX ADMIN — IPRATROPIUM BROMIDE AND ALBUTEROL SULFATE SCH ML: .5; 3 SOLUTION RESPIRATORY (INHALATION) at 20:52

## 2020-03-20 RX ADMIN — DEXTROSE MONOHYDRATE SCH MLS/HR: 50 INJECTION, SOLUTION INTRAVENOUS at 14:28

## 2020-03-20 NOTE — NUR
pt.assessed.v/s assessed:values w/in normal limits.no c/o pain,nausea.pt.assessed for 
cleanliness.pt.repositioned.iv access 

intact;patent.iv lock.

i have apprised the pt.that snacks/beverages may be provided w/in the shift.no requests 
posited@this hour.general status stable.respiratory status stable;unlabored.02-sat%=96%.call 
light/telephone placed w/in reach of the pt.

## 2020-03-20 NOTE — NUR
Rounds:



Patient is resting in bed, no acute distress. Even, unlabored respirations on 2L NC. Patient 
tolerating current oxygenation therapy well. Call light with patient. Will continue to 
monitor.

## 2020-03-20 NOTE — NUR
change of shift.pt.presents quiescent affect;calm,resting viewing tv programing.no c/o 
pain,nausea.pt.presents high risk;fall.precaution noted.

bed alarm on,side rails x3 positioned.pt.educated to call nsg for assistance;press 
red-button /remote.general status stable.respiratory status stable;unlabored.o2 therapy 
administered @2l./min via nasal cannulae.

## 2020-03-20 NOTE — NUR
CONSULTATION PAGED



REASON FOR CONSULTATION:DEPRESSION

WAS CONSULT CALLED?Y

PERSON WHO WAS NOTIFIED:JUNIOR

CONSULTING PHYSICIAN:,BRADFORD

CONSULTANT SPECIALTY:PSYCH

C`ONSULTANT PHONE NUMBER:[phone]

[facility]

## 2020-03-20 NOTE — NUR
2100pmedications administered.pt.capable to ingest the medications w/out difficulty.i have 
apprised the pt.that i have 

administered ambien:10mg po;per the pt's requests medication;sleep.no additional 

requests posited@this hour.

## 2020-03-20 NOTE — NUR
CONSULTATION PAGED



REASON FOR CONSULTATION:AB LFT

WAS CONSULT CALLED?Y

PERSON WHO WAS NOTIFIED:CALDERON

CONSULTING PHYSICIAN:BREE WESLEY

CONSULTANT SPECIALTY:GI

C`ONSULTANT PHONE NUMBER:[phone]

REQUESTING PHYSICIANZAHEER VILLALPANDO

## 2020-03-20 NOTE — NUR
Rounds:



Patient is resting comfortably in bed. No acute distress. Tolerating 3L NC. Call light with 
patient. Will continue to monitor.

## 2020-03-20 NOTE — NUR
DR GUIDO ROUNDS 

PATIENT SEEN BY MD DISCUSSED PATIENT CONDITION , PATIENT EEG DONE PENDING , PER MD TO FOLLOW 
UP WITH DR HAYWOOD (NEUROLOGIST) PATIENT WAS LAZY WALKING WITH PT TODAY

## 2020-03-20 NOTE — NUR
ENDORSEMENT 

ENDORSED PATIENT IN STABLE CONDITION WITH BORIS, PATIENT WAS SEEN BY DR OH (GI) BEC 
OF ABNORMAL LFT AND INFORMED MD THAT  PATIENT SAID SHE FELT LIKE SHE CANT SWALLOW  GOOD BEC 
SHE IS COUGHING, MD WILL DO FURTHER STUDY , PATIENT ATE HER DINNER WITH NO ASPIRATION ,IV 
ATB GIVEN AND TOLERATED , SON ELY CALLED AND UNDERSIGNED LEFT MESSAGE TO DR GUIDO TO CALL 
THE SON IN AM, PATIENT ABLE TO STAND UP NEXT TO BSC , STANDBY ASSIST .

## 2020-03-20 NOTE — NUR
pt.assessed.pt.presents quiescent affect;calm,somnolent.pt.assessed for 
cleanliness.pt.repositioned.iv access intact; 

patent.

general status stable.respiratory status stable;unlabored:02-sat%=96%.call light/telephone 
placed w/in reach of the pt.

-------------------------------------------------------------------------------

Addendum: 03/21/20 at 0209 by Hank Calderon RN

-------------------------------------------------------------------------------

per flacc;pain-mgx;pt;absent facial grimaces/body posturing.

## 2020-03-20 NOTE — NUR
Closing note:



Patient is awake in bed, no acute distress. Even, unlabored respirations on 3L NC. IV site 
patent and intact. All needs met. Will endorse care to dayshift RN.

-------------------------------------------------------------------------------

Addendum: 03/20/20 at 0648 by Edil Lynn RN

-------------------------------------------------------------------------------

Correction: patient is on 2L NC.

## 2020-03-20 NOTE — NUR
RN INITIAL NOTES 

RECEIVED PATIENT IN BED ALERT AWAKE AND ANXIOUS NO DISTRESS BUT BP IS HI , WILL CONT CARE , 
BREATHING TX DONE AND WHEEZING LESSENED AFTER BREATHING TREATMENT

## 2020-03-20 NOTE — NUR
Discharge Planning:

DCP faxed pt referral to [facility] (f [phone] p [phone]) DCP to follow up

## 2020-03-20 NOTE — NUR
DC Planning: Updated clinical status to tj Melendez at [facility] : the pt is not stable for dc 
today. 

Pending: EEG for Syncopal episode rule out seizure episode, PT eval, NA and K improved . 

Per Nora: if pt dc during weekend and need assistance, may call tj Morales at # [phone].

## 2020-03-20 NOTE — NUR
Headache:



Patient is awake, complaining of a headache 3/10. PRN APAP 500 MG administered per MD order. 
Education provided regarding indications, side effects. Patient verbalized understanding. 
Call light is with patient. Will continue monitoring.

## 2020-03-21 VITALS — SYSTOLIC BLOOD PRESSURE: 140 MMHG

## 2020-03-21 VITALS — SYSTOLIC BLOOD PRESSURE: 145 MMHG

## 2020-03-21 VITALS — SYSTOLIC BLOOD PRESSURE: 151 MMHG

## 2020-03-21 VITALS — SYSTOLIC BLOOD PRESSURE: 142 MMHG

## 2020-03-21 VITALS — SYSTOLIC BLOOD PRESSURE: 139 MMHG

## 2020-03-21 LAB
ALBUMIN SERPL BCP-MCNC: 2.3 G/DL (ref 3.4–4.8)
ALT SERPL W P-5'-P-CCNC: 80 U/L (ref 12–78)
ANION GAP SERPL CALCULATED.3IONS-SCNC: 8 MMOL/L (ref 5–15)
AST SERPL W P-5'-P-CCNC: 48 U/L (ref 10–37)
BASOPHILS # BLD AUTO: 0.1 K/UL (ref 0–0.2)
BASOPHILS NFR BLD AUTO: 1 % (ref 0–2)
BILIRUB SERPL-MCNC: 0.5 MG/DL (ref 0–1)
BUN SERPL-MCNC: 27 MG/DL (ref 8–21)
CALCIUM SERPL-MCNC: 8.4 MG/DL (ref 8.4–11)
CHLORIDE SERPL-SCNC: 96 MMOL/L (ref 98–107)
CREAT SERPL-MCNC: 1.54 MG/DL (ref 0.55–1.3)
EOSINOPHIL # BLD AUTO: 0.5 K/UL (ref 0–0.4)
EOSINOPHIL NFR BLD AUTO: 4.8 % (ref 0–4)
ERYTHROCYTE [DISTWIDTH] IN BLOOD BY AUTOMATED COUNT: 14.1 % (ref 9–15)
GFR SERPL CREATININE-BSD FRML MDRD: (no result) ML/MIN (ref 90–?)
GLUCOSE SERPL-MCNC: 102 MG/DL (ref 70–99)
HCT VFR BLD AUTO: 31 % (ref 36–48)
HGB BLD-MCNC: 10.3 G/DL (ref 12–16)
INR PPP: 1.1 (ref 0.8–1.2)
LYMPHOCYTES # BLD AUTO: 1 K/UL (ref 1–5.5)
LYMPHOCYTES NFR BLD AUTO: 9.2 % (ref 20.5–51.5)
MCH RBC QN AUTO: 29 PG (ref 27–31)
MCHC RBC AUTO-ENTMCNC: 33 % (ref 32–36)
MCV RBC AUTO: 88 FL (ref 79–98)
MONOCYTES # BLD MANUAL: 1.2 K/UL (ref 0–1)
MONOCYTES # BLD MANUAL: 10.2 % (ref 1.7–9.3)
NEUTROPHILS # BLD AUTO: 8.4 K/UL (ref 1.8–7.7)
NEUTROPHILS NFR BLD AUTO: 74.8 % (ref 40–70)
PLATELET # BLD AUTO: 240 K/UL (ref 130–430)
POTASSIUM SERPL-SCNC: 4.4 MMOL/L (ref 3.5–5.1)
PROTHROMBIN TIME: 11.1 SECS (ref 9.5–12.5)
RBC # BLD AUTO: 3.54 MIL/UL (ref 4.2–6.2)
SODIUM SERPLBLD-SCNC: 131 MMOL/L (ref 136–145)
WBC # BLD AUTO: 11.3 K/UL (ref 4.8–10.8)

## 2020-03-21 RX ADMIN — IPRATROPIUM BROMIDE AND ALBUTEROL SULFATE SCH ML: .5; 3 SOLUTION RESPIRATORY (INHALATION) at 15:34

## 2020-03-21 RX ADMIN — IPRATROPIUM BROMIDE AND ALBUTEROL SULFATE SCH ML: .5; 3 SOLUTION RESPIRATORY (INHALATION) at 07:18

## 2020-03-21 RX ADMIN — OXCARBAZEPINE SCH MG: 150 TABLET, FILM COATED ORAL at 09:05

## 2020-03-21 RX ADMIN — DEXTROSE MONOHYDRATE SCH MLS/HR: 50 INJECTION, SOLUTION INTRAVENOUS at 13:33

## 2020-03-21 RX ADMIN — EZETIMIBE SCH MG: 10 TABLET ORAL at 09:05

## 2020-03-21 RX ADMIN — IPRATROPIUM BROMIDE AND ALBUTEROL SULFATE SCH ML: .5; 3 SOLUTION RESPIRATORY (INHALATION) at 11:18

## 2020-03-21 RX ADMIN — IPRATROPIUM BROMIDE AND ALBUTEROL SULFATE SCH ML: .5; 3 SOLUTION RESPIRATORY (INHALATION) at 03:20

## 2020-03-21 RX ADMIN — OXYBUTYNIN CHLORIDE SCH MG: 5 TABLET ORAL at 09:05

## 2020-03-21 RX ADMIN — CLOPIDOGREL BISULFATE SCH MG: 75 TABLET, FILM COATED ORAL at 09:05

## 2020-03-21 RX ADMIN — LEVOFLOXACIN SCH MG: 250 TABLET, FILM COATED ORAL at 09:05

## 2020-03-21 RX ADMIN — Medication SCH CAP: at 09:05

## 2020-03-21 NOTE — NUR
ASSISTED TO TRANSFER THE PATIENT BACK TO BED

Assisted patient back to bed. No acute distress. Continue on O2 2L/min via NC. Safety 
measure maintained. Call light within reached. Bed locked in low position, side rails up, 
bed alarm on. Continue to monitor.

## 2020-03-21 NOTE — NUR
CALLED [facility], SPOKE WITH CHRISTIAN ([facility]). PER CHRISTIAN WILL CALL THE NURSE 
SRINIVAS AND WAITED TO CALL BACK.

## 2020-03-21 NOTE — NUR
pt.assessed.v/s assessed;values w/in normal limits.pt.presents quiescent 
affect;calm,somnolent.pt.assessed for cleanliness.pt.repositioned.

no c/o pain,nausea.iv access intact;patent;iv lock.general status stable.respiratory status 
stable;unlabored;o2-

sat%=96%.

call light/telephone placed w/in reach of the pt.

-------------------------------------------------------------------------------

Addendum: 03/21/20 at 0210 by Hank Calderon RN

-------------------------------------------------------------------------------

per flacc;pain-mgx;pt.absent facial grimaces/body posturing.

## 2020-03-21 NOTE — NUR
RECEIVED THE CALLED FROM THE FAMILY

Received the call from the patient's daughter-in-law Mirela. Per Mirela, [facility] required the hospital nurse to call their nurse when the patient ready to discharge. 
They need to assess the patient before back to [facility].

## 2020-03-21 NOTE — NUR
RECEIVED THE CALL BACK FROM SRINIVAS

Per Srinivas, the patient needs a doctor's note regarding not to expose to coronavirus in the 
hospital. Otherwise, not accepting the patient to go back to [facility]. Talked to Dr. Arellano, and 
Dr. Arellano talked to Srinivas via phone.

## 2020-03-21 NOTE — NUR
FAXED THE PAPER FROM DR. GUIDO WITH SIGNATURE FOR DISCHARGE SUMMARY WHICH INCLUDED THE 
PATIENT NOT EXPOSED COVID 19.

## 2020-03-21 NOTE — NUR
ROUND

Patient resting in the bed. No acute distress. Continue on O2 2L/min via NC. Safety measure 
maintained. Call light within reached. Bed locked in low position, side rails up, bed alarm 
on. Continue to monitor.

## 2020-03-21 NOTE — NUR
pt.ased pt.prst quiescetn affctc;a;lm,somnnlent.pt.ass foer c;enmlienss.ptrrwsositiond.per 
flacc pain-mgxa;pt absent facial grimaces/body psotuirng.genral statu stabe.resoiratprt stsu 
stebl;unalbored;02-sat5=96%.iv acces inatct ;patbnet ivlock.

general statu stable.

## 2020-03-21 NOTE — NUR
pt.assessed.pt.presents quiescent affect;calm,somnolent.pt.assessed for 
cleanliness.pt.repositioned.per flacc pain-mgx;pt. absent facial grimaces/body 
posturing.general status stable.respiratory status stable;unlabored:02-sat%=96%.call 
light/telephone placed w/in reach of the pt.

## 2020-03-21 NOTE — NUR
pt.assessed.pt.presents quiescent affect;calm,somnolent.pt.assessed for 
cleanliness.pt.repositioned.general status stable.respiratory status 
stable;unlabored.02-sat%=96%.per flacc;pain-mgx;pt.absent facial grimaces/body posturing.

call light/telephone placed w/in reach of the pt.

## 2020-03-21 NOTE — NUR
AM SCHEDULE MED GIVEN

Am schedule PO med given, patient tolerated well. No swallow problem noted at this time, 
took one pill each time. No acute distress. Continue on O2 2L/min via NC. Safety measure 
maintained. Call light within reached. Bed locked in low position, side rails up, bed alarm 
on. Will continue to monitor.

## 2020-03-21 NOTE — NUR
OPENING NOTE

Patient resting in the bed. No acute distress. On O2 2L/min via NC. Skin warm and dry to 
touch. SL intact to RAC, no redness, no swelling, patent. Discussed the safety issue, use 
call light when needs help, and plan of care, verbally understanding. Safety measure 
maintained. Call light within reached. Bed locked in low position, side rails up, bed alarm 
on. Will continue to monitor.

## 2020-03-21 NOTE — NUR
Dietitian Recommendations



* Recommend mechanical soft diet w/ Ensure Enlive BID

(ONS provides 700 kcal/day, 40 gm protein/day)

RAS, RD



Please refer to Nutrition Assessment for details.

-------------------------------------------------------------------------------

Addendum: 03/21/20 at 1643 by L[address]ie [address] RD

-------------------------------------------------------------------------------

Amended: Links added.

## 2020-03-21 NOTE — NUR
Case mgt: DCP: Per PT notes, pt amb 35 ft yesterday--per nurse Irena, pt swallowing pills ok 
and verbalizing she wants to return to [facility] instead of [facility]. Irena is f/u on EEG order-- RN

## 2020-03-21 NOTE — NUR
CALLED SRINIVAS TO CONFIRM SHE RECEIVED THE FAX AND ACCEPT THE PATIENT BACK TO [facility].

## 2020-03-21 NOTE — NUR
ROUND

Patient resting in the bed and watching TV. No acute distress. Continue on O2 2L/min via NC. 
Safety measure maintained. Call light within reached. Bed locked in low position, side rails 
up, bed alarm on. Continue to monitor.

## 2020-03-21 NOTE — NUR
PT SERVICES

PT ambulated the patient, tolerated well. Transferred the patient to sit on the chair at 
bedside. No acute distress. Continue on O2 2L/min via NC. Safety measure maintained. Call 
light within reached. Continue to monitor.

## 2020-03-21 NOTE — NUR
D/C Patient

Patient given medication reconciliation form and D/C instructions. Exit Care provided. 
Patient verbalized understanding. MD discussed with patient the results and treatment 
provided. Ambulatory with steady gait for discharge to home. Patient in stable condition, ID 
band removed. IV catheter removed, intact and dressing applied, no active bleeding. 
E-prescription sent to [facility]. Patient and the son educated on medication 
regimen, follow up appointment, no alcohol beverage, verbally understanding. All belongings 
sent with patient.

## 2020-03-22 LAB
FERRITIN: 535 NG/ML (ref 15–150)
HCV AB S/CO SERPL IA: <0.1 S/CO RATIO (ref 0–0.9)

## 2020-03-24 LAB
ACTIN IGG SERPL-ACNC: 6 UNITS (ref 0–19)
LIVER-KIDNEY MICROSOMAL AB: 0.8 UNITS (ref 0–20)